# Patient Record
Sex: FEMALE | Race: WHITE | NOT HISPANIC OR LATINO | RURAL
[De-identification: names, ages, dates, MRNs, and addresses within clinical notes are randomized per-mention and may not be internally consistent; named-entity substitution may affect disease eponyms.]

---

## 2024-10-21 ENCOUNTER — LAB OUTSIDE AN ENCOUNTER (OUTPATIENT)
Dept: RURAL CLINIC 2 | Facility: CLINIC | Age: 64
End: 2024-10-21

## 2024-10-21 ENCOUNTER — DASHBOARD ENCOUNTERS (OUTPATIENT)
Age: 64
End: 2024-10-21

## 2024-10-21 ENCOUNTER — OFFICE VISIT (OUTPATIENT)
Dept: RURAL CLINIC 2 | Facility: CLINIC | Age: 64
End: 2024-10-21
Payer: COMMERCIAL

## 2024-10-21 VITALS
DIASTOLIC BLOOD PRESSURE: 77 MMHG | HEIGHT: 60 IN | BODY MASS INDEX: 28.9 KG/M2 | SYSTOLIC BLOOD PRESSURE: 162 MMHG | HEART RATE: 65 BPM | WEIGHT: 147.2 LBS | TEMPERATURE: 97.1 F

## 2024-10-21 DIAGNOSIS — K25.5 GASTRIC ULCER WITH PERFORATION, UNSPECIFIED CHRONICITY: ICD-10-CM

## 2024-10-21 DIAGNOSIS — Z87.11 PERSONAL HISTORY OF GASTRIC ULCER: ICD-10-CM

## 2024-10-21 DIAGNOSIS — I25.10 CORONARY ARTERY DISEASE INVOLVING NATIVE CORONARY ARTERY OF NATIVE HEART WITHOUT ANGINA PECTORIS: ICD-10-CM

## 2024-10-21 DIAGNOSIS — Z95.5 H/O HEART ARTERY STENT: ICD-10-CM

## 2024-10-21 DIAGNOSIS — Z98.84 HISTORY OF GASTRIC BYPASS: ICD-10-CM

## 2024-10-21 DIAGNOSIS — K21.9 GERD WITHOUT ESOPHAGITIS: ICD-10-CM

## 2024-10-21 DIAGNOSIS — I21.9 MYOCARDIAL INFARCTION, UNSPECIFIED MI TYPE, UNSPECIFIED ARTERY: ICD-10-CM

## 2024-10-21 PROBLEM — 266435005: Status: ACTIVE | Noted: 2024-10-21

## 2024-10-21 PROBLEM — 9829001: Status: ACTIVE | Noted: 2024-10-21

## 2024-10-21 PROBLEM — 698450007: Status: ACTIVE | Noted: 2024-10-21

## 2024-10-21 PROBLEM — 428375006: Status: ACTIVE | Noted: 2024-10-21

## 2024-10-21 PROBLEM — 22298006: Status: ACTIVE | Noted: 2024-10-21

## 2024-10-21 PROBLEM — 275548000: Status: ACTIVE | Noted: 2024-10-21

## 2024-10-21 PROBLEM — 53741008: Status: ACTIVE | Noted: 2024-10-21

## 2024-10-21 PROCEDURE — 99243 OFF/OP CNSLTJ NEW/EST LOW 30: CPT | Performed by: NURSE PRACTITIONER

## 2024-10-21 PROCEDURE — 99203 OFFICE O/P NEW LOW 30 MIN: CPT | Performed by: NURSE PRACTITIONER

## 2024-10-21 RX ORDER — FLUCONAZOLE 200 MG/1
1 TABLET TABLET ORAL
Status: DISCONTINUED | COMMUNITY

## 2024-10-21 RX ORDER — FLUTICASONE PROPIONATE 50 UG/1
1 SPRAY IN EACH NOSTRIL SPRAY, METERED NASAL ONCE A DAY
Status: DISCONTINUED | COMMUNITY

## 2024-10-21 RX ORDER — HYDROCODONE BITARTRATE AND ACETAMINOPHEN 5; 325 MG/1; MG/1
1 TABLET AS NEEDED TABLET ORAL
Status: ACTIVE | COMMUNITY

## 2024-10-21 RX ORDER — IBUPROFEN SODIUM 256 MG/1
1 TABLET WITH FOOD OR MILK AS NEEDED TABLET, COATED ORAL THREE TIMES A DAY
Status: DISCONTINUED | COMMUNITY

## 2024-10-21 NOTE — HPI-ZZZTODAY'S VISIT
The patient is a 63-year-old female who presents on referral from [facility] for history of gastric bypass status post perforated gastric ulcer.  A copy of this document to be sent to the referring provider.  The patient  reportedly underwent emergency surgery for a perforated gastric ulcer this past June.  She said the cause of her ulcer is unknown and thought to be secondary to her gastric bypass that was done over 20 years ago.  She denies a history of chronic nonsteroidal use.  She reports a history of chronic GERD and will take over-the-counter acid reducers several times a week. Past medical history includes coronary artery disease, myocardial infarction and heart stent placement.